# Patient Record
Sex: MALE | NOT HISPANIC OR LATINO | Employment: FULL TIME | URBAN - METROPOLITAN AREA
[De-identification: names, ages, dates, MRNs, and addresses within clinical notes are randomized per-mention and may not be internally consistent; named-entity substitution may affect disease eponyms.]

---

## 2018-10-15 ENCOUNTER — HOSPITAL ENCOUNTER (OUTPATIENT)
Dept: PREADMISSION TESTING | Facility: HOSPITAL | Age: 48
Discharge: HOME | End: 2018-10-15
Attending: ORTHOPAEDIC SURGERY | Admitting: ORTHOPAEDIC SURGERY

## 2018-10-15 LAB
ANION GAP SERPL CALC-SCNC: 15.3 MMOL/L (ref 10–20)
BASOPHILS # BLD AUTO: 0.1 10*3/UL (ref 0–0.2)
BASOPHILS NFR BLD AUTO: 1 % (ref 0–2)
BILIRUB UR QL STRIP: NEGATIVE MG/DL
BUN SERPL-MCNC: 16 MG/DL (ref 8–20)
BUN/CREAT SERPL: 17.8 (ref 6.2–20.3)
CALCIUM SERPL-MCNC: 9.5 MG/DL (ref 8.9–10.3)
CASTS URNS QL MICRO: NORMAL /[LPF]
CHLORIDE SERPL-SCNC: 97 MMOL/L (ref 101–111)
COLOR UR: YELLOW
CONV BACTERIA IN URINE MICRO: NEGATIVE
CONV CLARITY OF URINE: CLEAR
CONV CO2: 29 MMOL/L (ref 22–32)
CONV HYALINE CASTS IN URINE MICRO: 0 /[LPF] (ref 0–5)
CONV PROTEIN IN URINE BY AUTOMATED TEST STRIP: NEGATIVE MG/DL
CONV SMALL ROUND CELLS: NORMAL /[HPF]
CONV UROBILINOGEN IN URINE BY AUTOMATED TEST STRIP: 0.2 MG/DL
CREAT UR-MCNC: 0.9 MG/DL (ref 0.7–1.2)
CULTURE INDICATED?: NORMAL
DIFFERENTIAL METHOD BLD: (no result)
EOSINOPHIL # BLD AUTO: 0.1 10*3/UL (ref 0–0.3)
EOSINOPHIL # BLD AUTO: 1 % (ref 0–3)
ERYTHROCYTE [DISTWIDTH] IN BLOOD BY AUTOMATED COUNT: 13.9 % (ref 11.5–14.5)
GLUCOSE SERPL-MCNC: 109 MG/DL (ref 65–99)
GLUCOSE UR QL: NEGATIVE MG/DL
HCT VFR BLD AUTO: 42.3 % (ref 40–54)
HGB BLD-MCNC: 14.4 G/DL (ref 14–18)
HGB UR QL STRIP: NEGATIVE
KETONES UR QL STRIP: NEGATIVE MG/DL
LEUKOCYTE ESTERASE UR QL STRIP: NEGATIVE
LYMPHOCYTES # BLD AUTO: 2.4 10*3/UL (ref 0.8–4.8)
LYMPHOCYTES NFR BLD AUTO: 21 % (ref 18–42)
MCH RBC QN AUTO: 30.4 PG (ref 26–32)
MCHC RBC AUTO-ENTMCNC: 34.1 G/DL (ref 32–36)
MCV RBC AUTO: 89.1 FL (ref 80–94)
MICRO REPORT STATUS: NORMAL
MONOCYTES # BLD AUTO: 0.7 10*3/UL (ref 0.1–1.3)
MONOCYTES NFR BLD AUTO: 6 % (ref 2–11)
NEUTROPHILS # BLD AUTO: 8.3 10*3/UL (ref 2.3–8.6)
NEUTROPHILS NFR BLD AUTO: 71 % (ref 50–75)
NITRITE UR QL STRIP: NEGATIVE
NRBC BLD AUTO-RTO: 0 /100{WBCS}
NRBC/RBC NFR BLD MANUAL: 0 10*3/UL
PH UR STRIP.AUTO: 7 [PH] (ref 4.5–8)
PLATELET # BLD AUTO: 332 10*3/UL (ref 150–450)
PMV BLD AUTO: 7.6 FL (ref 7.4–10.4)
POTASSIUM SERPL-SCNC: 4.3 MMOL/L (ref 3.6–5.1)
RBC # BLD AUTO: 4.74 10*6/UL (ref 4.6–6)
RBC #/AREA URNS HPF: 0 /[HPF] (ref 0–3)
SODIUM SERPL-SCNC: 137 MMOL/L (ref 136–144)
SP GR UR: 1.01 (ref 1–1.03)
SPERM URNS QL MICRO: NORMAL /[HPF]
SQUAMOUS SPT QL MICRO: 0 /[HPF] (ref 0–5)
UNIDENT CRYS URNS QL MICRO: NORMAL /[HPF]
WBC # BLD AUTO: 11.7 10*3/UL (ref 4.5–11.5)
WBC #/AREA URNS HPF: 0 /[HPF] (ref 0–5)
YEAST SPEC QL WET PREP: NORMAL /[HPF]

## 2018-11-13 ENCOUNTER — TRANSCRIBE ORDERS (OUTPATIENT)
Dept: PHYSICAL THERAPY | Facility: CLINIC | Age: 48
End: 2018-11-13

## 2018-11-13 ENCOUNTER — TREATMENT (OUTPATIENT)
Dept: PHYSICAL THERAPY | Facility: CLINIC | Age: 48
End: 2018-11-13

## 2018-11-13 DIAGNOSIS — M25.512 LEFT SHOULDER PAIN, UNSPECIFIED CHRONICITY: Primary | ICD-10-CM

## 2018-11-13 DIAGNOSIS — M25.512 ACUTE PAIN OF LEFT SHOULDER: Primary | ICD-10-CM

## 2018-11-13 PROCEDURE — 97014 ELECTRIC STIMULATION THERAPY: CPT | Performed by: PHYSICAL THERAPIST

## 2018-11-13 PROCEDURE — 97110 THERAPEUTIC EXERCISES: CPT | Performed by: PHYSICAL THERAPIST

## 2018-11-13 PROCEDURE — 97140 MANUAL THERAPY 1/> REGIONS: CPT | Performed by: PHYSICAL THERAPIST

## 2018-11-13 PROCEDURE — 97161 PT EVAL LOW COMPLEX 20 MIN: CPT | Performed by: PHYSICAL THERAPIST

## 2018-11-13 NOTE — PROGRESS NOTES
Physical Therapy Initial Evaluation and Plan of Care        Subjective Evaluation    History of Present Illness  Date of onset: 2018 (approximately)  Date of surgery: 1st week of october.  Mechanism of injury: Using a will hammer - got stuck in concrete and he pulled hard; linda ~ 6 wks ago for torn RC      Patient Occupation:    Precautions and Work Restrictions: off nowPain  Current pain ratin  At best pain rating: 3  At worst pain ratin  Location: ant left shoulder  Quality: burning, dull ache, throbbing and sharp  Relieving factors: rest, support and ice  Aggravating factors: overhead activity, outstretched reach and movement  Symptom course: better until MD moved his arm.    Hand dominance: right    Diagnostic Tests  MRI studies: abnormal (told significant RCT)    Treatments  Previous treatment: immobilization  Current treatment: medication  Patient Goals  Patient goals for therapy: decreased pain, increased motion, increased strength and return to sport/leisure activities             Objective       Observations     Additional Observation Details  Scars well healed; FHP    Tenderness     Additional Tenderness Details  Mild tenderness around scars and ant GHJ    Cervical/Thoracic Screen   Cervical range of motion within normal limits with the following exceptions: WFL    Active Range of Motion   Left Shoulder   Flexion: 85 degrees   Abduction: 63 degrees   External rotation BTH: Active external rotation behind the head: to top of head.   Internal rotation BTB: sacrum     Right Shoulder   Normal active range of motion    Passive Range of Motion   Left Shoulder   Flexion: 90 degrees   Abduction: 76 (in scap plane) degrees   External rotation 0°: 14 degrees   Internal rotation 0°: 69 (hand at abdomen) degrees     Scapular Mobility   Left Shoulder   Scapular mobility: fair    Strength/Myotome Testing     Additional Strength Details  deferred    Tests     Additional Tests  Details  deffered         Assessment & Plan     Assessment  Impairments: abnormal or restricted ROM, activity intolerance, impaired physical strength and pain with function  Assessment details: 49 yo M presents 6 wks s/p RCR non-dominant shdr with limited and painful ROM, strength and function as expected.  Needs long-term, gradually progressive rehab to restore functional mobility and strength and manage pain  Prognosis: good  Functional Limitations: carrying objects, lifting, pulling, pushing, uncomfortable because of pain, reaching behind back, reaching overhead and unable to perform repetitive tasks  Goals  Plan Goals: STGs (3 weeks)    1. Patient demonstrates compliance with HEP and precautions  2. Patient demonstrates progress with P/AAROM per expectations  3. Patient reports decreased sleep disturbance  4. Patient reports pain with exercises and light ADLs (eg. Grooming) < 5/10    LTGs (3 months)    1. Patient demonstrates independence with HEP and joint protection principles  2. AROM > WFL with min to no pain and strength > 4/5 for improved ADLs  3. Patient presents with min to no signs/sxs of impingement  4. Patient demonstrates tolerance for return to prior level ADLs with min to no restrictions    Plan  Therapy options: will be seen for skilled physical therapy services  Planned modality interventions: cryotherapy and electrical stimulation/Russian stimulation  Planned therapy interventions: manual therapy, therapeutic activities, stretching, strengthening, home exercise program, functional ROM exercises and body mechanics training  Frequency: 2-3 x wk.  Duration in weeks: 16  Treatment plan discussed with: patient        Manual Therapy:    10     mins  50202;  Therapeutic Exercise:    16     mins  86151;       Timed Treatment:   26   mins   Total Treatment:     58   mins    PT SIGNATURE: Laila Sutton PT   DATE TREATMENT INITIATED: 11/13/2018    Initial Certification  Certification Period: 2/11/2019  I  certify that the therapy services are furnished while this patient is under my care.  The services outlined above are required by this patient, and will be reviewed every 90 days.     PHYSICIAN:  Gabby     DATE: 11/13/18    Please sign and return via fax to [phone].. Thank you, [facility].

## 2018-11-14 ENCOUNTER — TREATMENT (OUTPATIENT)
Dept: PHYSICAL THERAPY | Facility: CLINIC | Age: 48
End: 2018-11-14

## 2018-11-14 DIAGNOSIS — M25.512 ACUTE PAIN OF LEFT SHOULDER: Primary | ICD-10-CM

## 2018-11-14 PROCEDURE — 97014 ELECTRIC STIMULATION THERAPY: CPT | Performed by: PHYSICAL THERAPIST

## 2018-11-14 PROCEDURE — 97140 MANUAL THERAPY 1/> REGIONS: CPT | Performed by: PHYSICAL THERAPIST

## 2018-11-14 PROCEDURE — 97110 THERAPEUTIC EXERCISES: CPT | Performed by: PHYSICAL THERAPIST

## 2018-11-14 NOTE — PROGRESS NOTES
Physical Therapy Daily Progress Note      Visit # 2      Subjective   Sore - instinctively reached my arm while in bed and it hurt    Objective   See Exercise, Manual, and Modality Logs for complete treatment.       Assessment/Plan    Continues with mod pain/soreness but able to progress PROM and ex    Continue to progress per protocol as tang             Manual Therapy:    13     mins  21820;  Therapeutic Exercise:    24     mins  90931;         Timed Treatment:   37   mins   Total Treatment:     52   mins    Laial Sutton PT  Physical Therapist

## 2018-11-16 ENCOUNTER — TREATMENT (OUTPATIENT)
Dept: PHYSICAL THERAPY | Facility: CLINIC | Age: 48
End: 2018-11-16

## 2018-11-16 DIAGNOSIS — M25.512 ACUTE PAIN OF LEFT SHOULDER: Primary | ICD-10-CM

## 2018-11-16 PROCEDURE — 97110 THERAPEUTIC EXERCISES: CPT | Performed by: PHYSICAL THERAPIST

## 2018-11-16 PROCEDURE — 97014 ELECTRIC STIMULATION THERAPY: CPT | Performed by: PHYSICAL THERAPIST

## 2018-11-16 PROCEDURE — 97140 MANUAL THERAPY 1/> REGIONS: CPT | Performed by: PHYSICAL THERAPIST

## 2018-11-16 NOTE — PROGRESS NOTES
Physical Therapy Daily Progress Note      Visit # 3      Subjective   Hurting today - rolled over on it during sleep last night.  Hurts mostly here (clavicle/supra-clavicular)    Objective   See Exercise, Manual, and Modality Logs for complete treatment.       Assessment/Plan    Minimal increase in ex today due to being flared-up from sleeping on shoulder.  Elevation PROM progressing nicely.  ER also increasing but still mod limited and painful.    Progress per protocol as tang             Manual Therapy:   14     mins  83148;  Therapeutic Exercise:    24     mins  24283;       Timed Treatment:   38   mins   Total Treatment:     48   mins    Laila Sutton PT  Physical Therapist

## 2018-11-20 ENCOUNTER — TREATMENT (OUTPATIENT)
Dept: PHYSICAL THERAPY | Facility: CLINIC | Age: 48
End: 2018-11-20

## 2018-11-20 DIAGNOSIS — M25.512 ACUTE PAIN OF LEFT SHOULDER: Primary | ICD-10-CM

## 2018-11-20 PROCEDURE — 97140 MANUAL THERAPY 1/> REGIONS: CPT | Performed by: PHYSICAL THERAPIST

## 2018-11-20 PROCEDURE — 97110 THERAPEUTIC EXERCISES: CPT | Performed by: PHYSICAL THERAPIST

## 2018-11-20 NOTE — PROGRESS NOTES
Physical Therapy Daily Progress Note      Visit # 4      Subjective   Sore ever since I slept on it the other day.  Also hurts when I try to raise my arm on its own.    Objective   See Exercise, Manual, and Modality Logs for complete treatment.       Assessment/Plan    P/AAROM improving nicely with manual Rx but still with mod soreness.  Had to caution him not to raise arm actively yet - possible cause of increased soreness.    Progress ex as tang per protocol.             Manual Therapy:    14     mins  59322;  Therapeutic Exercise:    25     mins  61576;       Timed Treatment:   39   mins   Total Treatment:     49   mins    Laila Sutton PT  Physical Therapist

## 2018-11-26 ENCOUNTER — TREATMENT (OUTPATIENT)
Dept: PHYSICAL THERAPY | Facility: CLINIC | Age: 48
End: 2018-11-26

## 2018-11-26 DIAGNOSIS — M25.512 ACUTE PAIN OF LEFT SHOULDER: Primary | ICD-10-CM

## 2018-11-26 PROCEDURE — 97140 MANUAL THERAPY 1/> REGIONS: CPT | Performed by: PHYSICAL THERAPIST

## 2018-11-26 PROCEDURE — 97110 THERAPEUTIC EXERCISES: CPT | Performed by: PHYSICAL THERAPIST

## 2018-11-30 ENCOUNTER — TREATMENT (OUTPATIENT)
Dept: PHYSICAL THERAPY | Facility: CLINIC | Age: 48
End: 2018-11-30

## 2018-11-30 DIAGNOSIS — M25.512 ACUTE PAIN OF LEFT SHOULDER: Primary | ICD-10-CM

## 2018-11-30 PROCEDURE — 97140 MANUAL THERAPY 1/> REGIONS: CPT | Performed by: PHYSICAL THERAPIST

## 2018-11-30 PROCEDURE — 97110 THERAPEUTIC EXERCISES: CPT | Performed by: PHYSICAL THERAPIST

## 2018-11-30 NOTE — PROGRESS NOTES
Physical Therapy Daily Progress Note      Visit # 6      Subjective   Overdid it trying to help father who is injured - shoulder is sore    Objective   See Exercise, Manual, and Modality Logs for complete treatment.       Assessment/Plan    Good tolerance for revision and advancement of exercises despite increased soreness from ADLs    Progress reps as tang and add FF and  ext to hip               Manual Therapy:    12     mins  04213;  Therapeutic Exercise:    29     mins  59270;       Timed Treatment:   41   mins   Total Treatment:     41   mins    Laila Sutton, PT  Physical Therapist

## 2018-12-03 ENCOUNTER — TREATMENT (OUTPATIENT)
Dept: PHYSICAL THERAPY | Facility: CLINIC | Age: 48
End: 2018-12-03

## 2018-12-03 DIAGNOSIS — M25.512 ACUTE PAIN OF LEFT SHOULDER: Primary | ICD-10-CM

## 2018-12-03 PROCEDURE — 97110 THERAPEUTIC EXERCISES: CPT | Performed by: PHYSICAL THERAPIST

## 2018-12-03 PROCEDURE — 97140 MANUAL THERAPY 1/> REGIONS: CPT | Performed by: PHYSICAL THERAPIST

## 2018-12-03 NOTE — PROGRESS NOTES
Physical Therapy Daily Progress Note      Visit # 7      Subjective   Still sore but moving along    Objective   See Exercise, Manual, and Modality Logs for complete treatment.       Assessment/Plan    P/AAROM improving nicely and tolerating advancing ex.  Still with mod c/o soreness and scapular tightness      Re-assess           Manual Therapy:    12     mins  92147;  Therapeutic Exercise:    32     mins  67531;       Timed Treatment:   44   mins   Total Treatment:     44   mins    Laila Sutton PT  Physical Therapist

## 2018-12-05 ENCOUNTER — TREATMENT (OUTPATIENT)
Dept: PHYSICAL THERAPY | Facility: CLINIC | Age: 48
End: 2018-12-05

## 2018-12-05 DIAGNOSIS — M25.512 ACUTE PAIN OF LEFT SHOULDER: Primary | ICD-10-CM

## 2018-12-05 PROCEDURE — 97110 THERAPEUTIC EXERCISES: CPT | Performed by: PHYSICAL THERAPIST

## 2018-12-05 PROCEDURE — 97140 MANUAL THERAPY 1/> REGIONS: CPT | Performed by: PHYSICAL THERAPIST

## 2018-12-05 NOTE — PROGRESS NOTES
Physical Therapy  Progress Note          12/5/2018  Luciano Pierre MD    Re: Wilner Greer  ________________________________________________________________    Mr. Wilner Greer, has attended 8/10 PT sessions.  Treatment has consisted of: manual, there-ex, HEP, modalities and pt education.     S: Mr. Wilner Greer states: shoulder popping at times - feels a jammed finger.  Pain is still moderate and constant        Subjective     Objective       Observations     Additional Observation Details  Scars well healed; FHP    Tenderness     Additional Tenderness Details  Mild tenderness around scars and ant GHJ    Cervical/Thoracic Screen   Cervical range of motion within normal limits with the following exceptions: WFL    Active Range of Motion   Left Shoulder   Flexion: 130 degrees with pain  Abduction: 121 degrees with pain  External rotation BTH: C3 with pain  Internal rotation BTB: T12 with pain    Right Shoulder   Normal active range of motion    Passive Range of Motion   Left Shoulder   Flexion: 161 degrees   Abduction: 162 (in scap plane) degrees   External rotation 45°: 60 degrees   Internal rotation 45°: 86 degrees     Scapular Mobility   Left Shoulder   Scapular mobility: fair    Strength/Myotome Testing     Left Shoulder     Planes of Motion   Flexion: 4   Abduction: 4   External rotation at 0°: 4+   Internal rotation at 0°: 5     Right Shoulder   Normal muscle strength    Tests     Left Shoulder   Positive empty can and Hawkin's.      See Exercise, Manual, and Modality Logs for complete treatment.       Assessment/Plan    Good improvement in A/PROM and strength but still with some impingement and instability and moderate pain.    To MD             Manual Therapy:    14     mins  20583;  Therapeutic Exercise:    32     mins  20029;       Timed Treatment:   46   mins   Total Treatment:     46   mins    Laila Sutton PT  Physical Therapist

## 2018-12-10 ENCOUNTER — TREATMENT (OUTPATIENT)
Dept: PHYSICAL THERAPY | Facility: CLINIC | Age: 48
End: 2018-12-10

## 2018-12-10 DIAGNOSIS — M25.512 ACUTE PAIN OF LEFT SHOULDER: Primary | ICD-10-CM

## 2018-12-10 PROCEDURE — 97140 MANUAL THERAPY 1/> REGIONS: CPT | Performed by: PHYSICAL THERAPIST

## 2018-12-10 PROCEDURE — 97110 THERAPEUTIC EXERCISES: CPT | Performed by: PHYSICAL THERAPIST

## 2018-12-10 NOTE — PROGRESS NOTES
Physical Therapy Daily Progress Note    Visit # : 9  Wilner Greer reports: my shoulder is doing well; still hard to raise it over my head.  My MD is pleased with my progress    Subjective     Objective   See Exercise, Manual, and Modality Logs for complete treatment.       Assessment/Plan  Pt exhibits good jt mobility with reports of end range soreness with manual interventions.    Progress strengthening /stabilization /functional activity  Progress ball slides to wall         Manual Therapy:    12     mins  67396;  Therapeutic Exercise:    30     mins  11903;     Neuromuscular Delonte:    -    mins  28176;    Therapeutic Activity:     -     mins  18805;     Gait Training:      -     mins  94739;     Ultrasound:     -     mins  98710;    Electrical Stimulation:    -     mins  25815 ( );  Iontophoresis                 -     mins 68968      Timed Treatment:   42   mins   Total Treatment:     42   mins        Rianna Lackey PT  Physical Therapist  [place] License # [id number]

## 2018-12-14 ENCOUNTER — TREATMENT (OUTPATIENT)
Dept: PHYSICAL THERAPY | Facility: CLINIC | Age: 48
End: 2018-12-14

## 2018-12-14 DIAGNOSIS — M25.512 ACUTE PAIN OF LEFT SHOULDER: Primary | ICD-10-CM

## 2018-12-14 PROCEDURE — 97140 MANUAL THERAPY 1/> REGIONS: CPT | Performed by: PHYSICAL THERAPIST

## 2018-12-14 PROCEDURE — 97110 THERAPEUTIC EXERCISES: CPT | Performed by: PHYSICAL THERAPIST

## 2018-12-14 NOTE — PROGRESS NOTES
Physical Therapy Daily Progress Note      Visit # 10      Subjective   Doing OK - more mobility but still pain and weakness    Objective   See Exercise, Manual, and Modality Logs for complete treatment.       Assessment/Plan    ROM improving but still with intermittent impingement, pain and weakness.  Able to progress strengthening ex but fatigued    Progress reps on strengthening as tang             Manual Therapy:    14     mins  68766;  Therapeutic Exercise:    38     mins  34673;       Timed Treatment:   52   mins   Total Treatment:     52   mins    Laila Sutton PT  Physical Therapist

## 2018-12-17 ENCOUNTER — TREATMENT (OUTPATIENT)
Dept: PHYSICAL THERAPY | Facility: CLINIC | Age: 48
End: 2018-12-17

## 2018-12-17 DIAGNOSIS — M25.512 ACUTE PAIN OF LEFT SHOULDER: Primary | ICD-10-CM

## 2018-12-17 PROCEDURE — 97140 MANUAL THERAPY 1/> REGIONS: CPT | Performed by: PHYSICAL THERAPIST

## 2018-12-17 PROCEDURE — 97110 THERAPEUTIC EXERCISES: CPT | Performed by: PHYSICAL THERAPIST

## 2018-12-17 NOTE — PROGRESS NOTES
Physical Therapy Daily Progress Note      Visit # 11      Subjective   Pretty good actually - a little stiff from being active over the weekend with grandkids    Objective   See Exercise, Manual, and Modality Logs for complete treatment.       Assessment/Plan    ROM progressing to near full PROM but still with EOR pain and tightness but less impingement.  Gradually improving strength.    Progress strengthening reps as tang             Manual Therapy:    12     mins  16565;  Therapeutic Exercise:    38     mins  24731;       Timed Treatment:   50   mins   Total Treatment:     50   mins    Laila Sutton PT  Physical Therapist

## 2018-12-19 ENCOUNTER — TREATMENT (OUTPATIENT)
Dept: PHYSICAL THERAPY | Facility: CLINIC | Age: 48
End: 2018-12-19

## 2018-12-19 DIAGNOSIS — M25.512 ACUTE PAIN OF LEFT SHOULDER: Primary | ICD-10-CM

## 2018-12-19 PROCEDURE — 97140 MANUAL THERAPY 1/> REGIONS: CPT | Performed by: PHYSICAL THERAPIST

## 2018-12-19 PROCEDURE — 97110 THERAPEUTIC EXERCISES: CPT | Performed by: PHYSICAL THERAPIST

## 2018-12-19 NOTE — PROGRESS NOTES
Physical Therapy Daily Progress Note      Visit # 12      Subjective   Pretty sore the past couple days    Objective   See Exercise, Manual, and Modality Logs for complete treatment.       Assessment/Plan    Painful arc with elevation around .  Increased soreness from some of the band ex most likely - improved tolerance for changes to ex.     Continue per POC as tang; try table towel or ball scaption vs cane             Manual Therapy:    14     mins  95676;  Therapeutic Exercise:    39     mins  73067;       Timed Treatment:   53   mins   Total Treatment:     53   mins    Laila Sutton PT  Physical Therapist

## 2018-12-24 ENCOUNTER — TREATMENT (OUTPATIENT)
Dept: PHYSICAL THERAPY | Facility: CLINIC | Age: 48
End: 2018-12-24

## 2018-12-24 DIAGNOSIS — M25.512 ACUTE PAIN OF LEFT SHOULDER: Primary | ICD-10-CM

## 2018-12-24 PROCEDURE — 97110 THERAPEUTIC EXERCISES: CPT | Performed by: PHYSICAL THERAPIST

## 2018-12-24 PROCEDURE — 97140 MANUAL THERAPY 1/> REGIONS: CPT | Performed by: PHYSICAL THERAPIST

## 2018-12-24 NOTE — PROGRESS NOTES
Physical Therapy Daily Progress Note      Visit # 13      Subjective   Feeling bad in general but shoulder just sore    Objective       Passive Range of Motion   Left Shoulder   Flexion: 165 degrees   Abduction: 163 degrees   External rotation 90°: 76 degrees      See Exercise, Manual, and Modality Logs for complete treatment.       Assessment/Plan    Soreness persists but ROM gradually improving    Continue to progress ex as tang - try wall slides             Manual Therapy:    13     mins  50029;  Therapeutic Exercise:    39     mins  96737;         Timed Treatment:   52   mins   Total Treatment:     52   mins    Laila Sutton, PT  Physical Therapist

## 2018-12-26 ENCOUNTER — TREATMENT (OUTPATIENT)
Dept: PHYSICAL THERAPY | Facility: CLINIC | Age: 48
End: 2018-12-26

## 2018-12-26 DIAGNOSIS — M25.512 ACUTE PAIN OF LEFT SHOULDER: Primary | ICD-10-CM

## 2018-12-26 PROCEDURE — 97140 MANUAL THERAPY 1/> REGIONS: CPT | Performed by: PHYSICAL THERAPIST

## 2018-12-26 PROCEDURE — 97110 THERAPEUTIC EXERCISES: CPT | Performed by: PHYSICAL THERAPIST

## 2018-12-26 NOTE — PROGRESS NOTES
Physical Therapy Daily Progress Note      Visit # 14      Subjective   Not too bad - pain about 4/10    Objective   See Exercise, Manual, and Modality Logs for complete treatment.       Assessment/Plan    Slow improvement in mobility and strength.  Pain decreasing very gradually, limiting progression of ex.    Try prone T             Manual Therapy:    12     mins  89401;  Therapeutic Exercise:    40     mins  43553;       Timed Treatment:   52   mins   Total Treatment:     52  mins    Laila Sutton PT  Physical Therapist

## 2018-12-31 ENCOUNTER — TREATMENT (OUTPATIENT)
Dept: PHYSICAL THERAPY | Facility: CLINIC | Age: 48
End: 2018-12-31

## 2018-12-31 DIAGNOSIS — M25.512 ACUTE PAIN OF LEFT SHOULDER: Primary | ICD-10-CM

## 2018-12-31 PROCEDURE — 97110 THERAPEUTIC EXERCISES: CPT | Performed by: PHYSICAL THERAPIST

## 2018-12-31 PROCEDURE — 97140 MANUAL THERAPY 1/> REGIONS: CPT | Performed by: PHYSICAL THERAPIST

## 2018-12-31 NOTE — PROGRESS NOTES
Physical Therapy Daily Progress Note      Visit # 15      Subjective   Still sore and sharp pain certain ways I move it (abduction)    Objective   See Exercise, Manual, and Modality Logs for complete treatment.       Assessment/Plan    Continues pain limiting progression but overall improving very gradually with ROM and use of LUE.    reassess             Manual Therapy:    13     mins  26863;  Therapeutic Exercise:    39     mins  31108;       Timed Treatment:   52   mins   Total Treatment:     52   mins    Laila Sutton PT  Physical Therapist

## 2019-01-02 ENCOUNTER — TREATMENT (OUTPATIENT)
Dept: PHYSICAL THERAPY | Facility: CLINIC | Age: 49
End: 2019-01-02

## 2019-01-02 DIAGNOSIS — M25.512 ACUTE PAIN OF LEFT SHOULDER: Primary | ICD-10-CM

## 2019-01-02 PROCEDURE — 97110 THERAPEUTIC EXERCISES: CPT | Performed by: PHYSICAL THERAPIST

## 2019-01-02 PROCEDURE — 97140 MANUAL THERAPY 1/> REGIONS: CPT | Performed by: PHYSICAL THERAPIST

## 2019-01-02 NOTE — PROGRESS NOTES
Physical Therapy Daily Progress Note    To:  Luciano Pierre MD    Visit # 16  Treatment has consisted of progressive manual, there-ex and HEP as tolerated    Subjective   Still about the same soreness    Objective       Observations     Additional Observation Details  Scars well healed; FHP    Tenderness     Additional Tenderness Details  Mild tenderness around ant GHJ    Cervical/Thoracic Screen   Cervical range of motion within normal limits with the following exceptions: WFL    Active Range of Motion   Left Shoulder   Flexion: 137 degrees   Abduction: 125 degrees with pain  External rotation BTH: C7 with pain  Internal rotation BTB: T10 with pain    Right Shoulder   Normal active range of motion    Passive Range of Motion   Left Shoulder   Flexion: 166 degrees   Abduction: 164 (in scap plane) degrees   External rotation 90°: 76 degrees   Internal rotation 90°: 79 degrees     Scapular Mobility   Left Shoulder   Scapular mobility: fair    Strength/Myotome Testing     Left Shoulder     Planes of Motion   Flexion: 4+   Abduction: 4+   External rotation at 0°: 4+   Internal rotation at 0°: 5     Right Shoulder   Normal muscle strength    Tests     Left Shoulder   Positive empty can, full can and Hawkin's.      See Exercise, Manual, and Modality Logs for complete treatment.       Assessment/Plan    ROM is very gradually improving but pain is limiting overall progress.  ROM improves with manual but then tightens back up soon after with mm and capsular tension.  Needs continued PT focusing on improving mobility and decreasing pain and impingement.    To MD             Manual Therapy:    16     mins  02373;  Therapeutic Exercise:    39     mins  52038;       Timed Treatment:   55   mins   Total Treatment:     55   mins    Laila Sutton PT  Physical Therapist

## 2019-01-09 ENCOUNTER — TREATMENT (OUTPATIENT)
Dept: PHYSICAL THERAPY | Facility: CLINIC | Age: 49
End: 2019-01-09

## 2019-01-09 DIAGNOSIS — M25.512 ACUTE PAIN OF LEFT SHOULDER: Primary | ICD-10-CM

## 2019-01-09 PROCEDURE — 97110 THERAPEUTIC EXERCISES: CPT | Performed by: PHYSICAL THERAPIST

## 2019-01-09 PROCEDURE — 97140 MANUAL THERAPY 1/> REGIONS: CPT | Performed by: PHYSICAL THERAPIST

## 2019-01-09 NOTE — PROGRESS NOTES
Physical Therapy Daily Progress Note      Visit # 17      Subjective   MD said I am doing better than expected; pain is typically about 4/10 - not taking anymeds    Objective   See Exercise, Manual, and Modality Logs for complete treatment.       Assessment/Plan    Continues with gradual improvement and ahead of schedule per MD expectations.  Still with scap-thor tightness limiting AROM    Add thoracic rotation and ex over 1/2 roll             Manual Therapy:    14     mins  14670;  Therapeutic Exercise:    40     mins  94459;       Timed Treatment:   54   mins   Total Treatment:     54   mins    Laila Sutton PT  Physical Therapist

## 2019-01-11 ENCOUNTER — TREATMENT (OUTPATIENT)
Dept: PHYSICAL THERAPY | Facility: CLINIC | Age: 49
End: 2019-01-11

## 2019-01-11 DIAGNOSIS — M25.512 ACUTE PAIN OF LEFT SHOULDER: Primary | ICD-10-CM

## 2019-01-11 PROCEDURE — 97140 MANUAL THERAPY 1/> REGIONS: CPT | Performed by: PHYSICAL THERAPIST

## 2019-01-11 PROCEDURE — 97110 THERAPEUTIC EXERCISES: CPT | Performed by: PHYSICAL THERAPIST

## 2019-01-11 NOTE — PROGRESS NOTES
Physical Therapy Daily Progress Note      Visit # 18      Subjective   Feeling better overall but sore with the weather change    Objective   See Exercise, Manual, and Modality Logs for complete treatment.       Assessment/Plan    ROM improving with dec c/o pain but with some popping with IR - improved after adjusted range.    Progress strengthening gradually as tang             Manual Therapy:    15     mins  48240;  Therapeutic Exercise:    41     mins  58928;         Timed Treatment:   56   mins   Total Treatment:     56   mins    Laila Sutton PT  Physical Therapist

## 2019-01-15 ENCOUNTER — TREATMENT (OUTPATIENT)
Dept: PHYSICAL THERAPY | Facility: CLINIC | Age: 49
End: 2019-01-15

## 2019-01-15 DIAGNOSIS — M25.512 ACUTE PAIN OF LEFT SHOULDER: Primary | ICD-10-CM

## 2019-01-15 PROCEDURE — 97110 THERAPEUTIC EXERCISES: CPT | Performed by: PHYSICAL THERAPIST

## 2019-01-15 PROCEDURE — 97140 MANUAL THERAPY 1/> REGIONS: CPT | Performed by: PHYSICAL THERAPIST

## 2019-01-15 NOTE — PROGRESS NOTES
Physical Therapy Daily Progress Note      Visit # 19      Subjective   Nothing new - still sore but doing OK    Objective   See Exercise, Manual, and Modality Logs for complete treatment.       Assessment/Plan    Continues with some inhibition with scapular exercises and persistent mild soreness    Progress scapular strengthening as tang             Manual Therapy:    14     mins  05622;  Therapeutic Exercise:    38    mins  78223;       Timed Treatment:   52   mins   Total Treatment:     52   mins    Laila Sutton PT  Physical Therapist

## 2019-01-18 ENCOUNTER — TREATMENT (OUTPATIENT)
Dept: PHYSICAL THERAPY | Facility: CLINIC | Age: 49
End: 2019-01-18

## 2019-01-18 DIAGNOSIS — M25.512 ACUTE PAIN OF LEFT SHOULDER: Primary | ICD-10-CM

## 2019-01-18 PROCEDURE — 97140 MANUAL THERAPY 1/> REGIONS: CPT | Performed by: PHYSICAL THERAPIST

## 2019-01-18 PROCEDURE — 97110 THERAPEUTIC EXERCISES: CPT | Performed by: PHYSICAL THERAPIST

## 2019-01-18 NOTE — PROGRESS NOTES
Physical Therapy Daily Progress Note      Visit # 20      Subjective   Getting better - slowly but surely    Objective   See Exercise, Manual, and Modality Logs for complete treatment.       Assessment/Plan      Continues with gradual progress with near full PROM and tolerating mild increases in exercises with dec pain    Progress strengthening as tang           Manual Therapy:    12     mins  57217;  Therapeutic Exercise:    34     mins  44376;         Timed Treatment:   46   mins   Total Treatment:     46   mins    Laila Sutton PT  Physical Therapist

## 2019-01-23 ENCOUNTER — TREATMENT (OUTPATIENT)
Dept: PHYSICAL THERAPY | Facility: CLINIC | Age: 49
End: 2019-01-23

## 2019-01-23 DIAGNOSIS — M25.512 ACUTE PAIN OF LEFT SHOULDER: Primary | ICD-10-CM

## 2019-01-23 PROCEDURE — 97140 MANUAL THERAPY 1/> REGIONS: CPT | Performed by: PHYSICAL THERAPIST

## 2019-01-23 PROCEDURE — 97110 THERAPEUTIC EXERCISES: CPT | Performed by: PHYSICAL THERAPIST

## 2019-01-23 NOTE — PROGRESS NOTES
Physical Therapy Daily Progress Note      Visit # 21      Subjective   Slipped on ice and fell onto Left shoulder on Sunday.  Couldn't move it much that day but has been getting day    Objective       Tests     Additional Tests Details  Good control with drop arm but with pain     See Exercise, Manual, and Modality Logs for complete treatment.       Assessment/Plan    Flared-up from fall with increased pain and sl limited PROM but no obvious compromise to repair    Continue to monitor for possible compromise and progress accordingly             Manual Therapy:    12     mins  68047;  Therapeutic Exercise:    30     mins  77444;         Timed Treatment:   42   mins   Total Treatment:     42   mins    Laila Sutton, PT  Physical Therapist

## 2019-01-25 ENCOUNTER — TREATMENT (OUTPATIENT)
Dept: PHYSICAL THERAPY | Facility: CLINIC | Age: 49
End: 2019-01-25

## 2019-01-25 DIAGNOSIS — M25.512 ACUTE PAIN OF LEFT SHOULDER: Primary | ICD-10-CM

## 2019-01-25 PROCEDURE — 97140 MANUAL THERAPY 1/> REGIONS: CPT | Performed by: PHYSICAL THERAPIST

## 2019-01-25 PROCEDURE — 97110 THERAPEUTIC EXERCISES: CPT | Performed by: PHYSICAL THERAPIST

## 2019-01-25 NOTE — PROGRESS NOTES
Physical Therapy Daily Progress Note      Visit # 22      Subjective   It's coming back around but still have trouble with this movement (abduction)    Objective   See Exercise, Manual, and Modality Logs for complete treatment.       Assessment/Plan    Flare-up from fall improving.  Still with increased pain vs before the fall but much less than last visit.  PROM nearly returned to pre-fall motion and able to increase ex.    If continues to improve try return to prior level ex             Manual Therapy:    13     mins  86752;  Therapeutic Exercise:    33     mins  15846;         Timed Treatment:   46   mins   Total Treatment:     46   mins    Laila Sutton, ROSETTA  Physical Therapist

## 2019-01-29 ENCOUNTER — TREATMENT (OUTPATIENT)
Dept: PHYSICAL THERAPY | Facility: CLINIC | Age: 49
End: 2019-01-29

## 2019-01-29 DIAGNOSIS — M25.512 ACUTE PAIN OF LEFT SHOULDER: Primary | ICD-10-CM

## 2019-01-29 PROCEDURE — 97140 MANUAL THERAPY 1/> REGIONS: CPT | Performed by: PHYSICAL THERAPIST

## 2019-01-29 PROCEDURE — 97110 THERAPEUTIC EXERCISES: CPT | Performed by: PHYSICAL THERAPIST

## 2019-01-29 NOTE — PROGRESS NOTES
Physical Therapy Daily Progress Note      Visit # 23      Subjective   Still sore but much better    Objective   See Exercise, Manual, and Modality Logs for complete treatment.       Assessment/Plan    Flare-up from fall subsiding.  Near full PROM except IR.  Ex limited today due to patient time constraints    Re-assess         Manual Therapy:    13     mins  17055;  Therapeutic Exercise:    28     mins  26642;       Timed Treatment:   41   mins   Total Treatment:     41   mins    Laila Sutton PT  Physical Therapist

## 2019-01-30 ENCOUNTER — TREATMENT (OUTPATIENT)
Dept: PHYSICAL THERAPY | Facility: CLINIC | Age: 49
End: 2019-01-30

## 2019-01-30 DIAGNOSIS — M25.512 ACUTE PAIN OF LEFT SHOULDER: Primary | ICD-10-CM

## 2019-01-30 PROCEDURE — 97140 MANUAL THERAPY 1/> REGIONS: CPT | Performed by: PHYSICAL THERAPIST

## 2019-01-30 PROCEDURE — 97110 THERAPEUTIC EXERCISES: CPT | Performed by: PHYSICAL THERAPIST

## 2019-01-30 NOTE — PROGRESS NOTES
Physical Therapy  Progress Note          1/30/2019  Luciano Pierre MD    Re: Wilner Greer  ________________________________________________________________    Mr. Wilner Greer, has attended 24 PT sessions.  Treatment has consisted of: manual, NMR, progressive there-ex, HEP, pt education     S: Mr. Wilner Greer states: Feeling pretty good now but popping more now.    NOTE: reported slipping on ice and falling onto Left shoulder on 1/20/19. Did not have arm outstretched.  Had significant pain initially but has subsided since.      Subjective     Objective       Observations     Additional Observation Details  Scars well healed; FHP    Tenderness     Left Shoulder   Tenderness in the AC joint and subacromial bursa.     Cervical/Thoracic Screen   Cervical range of motion within normal limits with the following exceptions: WFL    Active Range of Motion   Left Shoulder   Flexion: 137 degrees with pain  Abduction: 136 (scap plane) degrees   External rotation BTH: T1   Internal rotation BTB: L2 with pain    Right Shoulder   Normal active range of motion    Passive Range of Motion   Left Shoulder   Flexion: 170 degrees   Abduction: 167 (in scap plane) degrees   External rotation 90°: 80 degrees   Internal rotation 90°: 80 degrees     Scapular Mobility   Left Shoulder   Scapular mobility: fair    Strength/Myotome Testing     Left Shoulder     Planes of Motion   Flexion: 4+   Abduction: 4+   External rotation at 0°: 4+   Left shoulder internal rotation strength at 0 degrees: 5-/5.     Right Shoulder   Normal muscle strength    Tests     Left Shoulder   Positive empty can, full can, Hawkin's and Speed's.     Additional Tests Details  Sl + sulcus L vs R     See Exercise, Manual, and Modality Logs for complete treatment.       Assessment/Plan    Fared-up last week from a fall onto the left shoulder and had to decrease exercises/activities.  Has since improved but there is minimal change now since last update whereas he  was showing more improvement before the fall.  Some increase in pain and impingement with mild instability but no obvious compromise to repair.    To MD             Manual Therapy:    14     mins  65945;  Therapeutic Exercise:    42     mins  61954;       Timed Treatment:   56   mins   Total Treatment:     56   mins    Laila Sutton PT  Physical Therapist

## 2019-02-01 ENCOUNTER — TREATMENT (OUTPATIENT)
Dept: PHYSICAL THERAPY | Facility: CLINIC | Age: 49
End: 2019-02-01

## 2019-02-01 DIAGNOSIS — M25.512 ACUTE PAIN OF LEFT SHOULDER: Primary | ICD-10-CM

## 2019-02-01 PROCEDURE — 97112 NEUROMUSCULAR REEDUCATION: CPT | Performed by: PHYSICAL THERAPIST

## 2019-02-01 PROCEDURE — 97110 THERAPEUTIC EXERCISES: CPT | Performed by: PHYSICAL THERAPIST

## 2019-02-01 NOTE — PROGRESS NOTES
Physical Therapy Daily Progress Note      Visit # 25      Subjective   MD doesn't think I did any damage - said to continue PT and on 20 lb restriction.  Says I'm doing well considering how much damage I had.    Objective   See Exercise, Manual, and Modality Logs for complete treatment.       Assessment/Plan    Continue with gradual improvement as flare-up resolves.  Tolerated light progression of several ex    Continue to progress strengthening as tang             Manual Therapy:    2     mins  73091;  Therapeutic Exercise:    39     mins  56098;     Neuromuscular Delonte:    9    mins  14005;      Timed Treatment:   50   mins   Total Treatment:     50   mins    Laila Sutton, ROSETTA  Physical Therapist

## 2019-02-05 ENCOUNTER — TREATMENT (OUTPATIENT)
Dept: PHYSICAL THERAPY | Facility: CLINIC | Age: 49
End: 2019-02-05

## 2019-02-05 DIAGNOSIS — M25.512 ACUTE PAIN OF LEFT SHOULDER: Primary | ICD-10-CM

## 2019-02-05 PROCEDURE — 97110 THERAPEUTIC EXERCISES: CPT | Performed by: PHYSICAL THERAPIST

## 2019-02-05 PROCEDURE — 97112 NEUROMUSCULAR REEDUCATION: CPT | Performed by: PHYSICAL THERAPIST

## 2019-02-05 NOTE — PROGRESS NOTES
Physical Therapy Daily Progress Note      Visit # 26      Subjective   Soreness from the fall is subsiding - about back to where I was before that    Objective   See Exercise, Manual, and Modality Logs for complete treatment.       Assessment/Plan    Improving scap mobility and strength noted with manual/NMR.  Flared-up primarily resolved.    Gradually increase resistance and/or reps; try band wall walks             Manual Therapy:    3     mins  04902;  Therapeutic Exercise:    38     mins  30443;     Neuromuscular Delonte:    8    mins  62936;      Timed Treatment:   49   mins   Total Treatment:     49   mins    Laila Sutotn, PT  Physical Therapist

## 2019-02-08 ENCOUNTER — TREATMENT (OUTPATIENT)
Dept: PHYSICAL THERAPY | Facility: CLINIC | Age: 49
End: 2019-02-08

## 2019-02-08 DIAGNOSIS — M25.512 ACUTE PAIN OF LEFT SHOULDER: Primary | ICD-10-CM

## 2019-02-08 PROCEDURE — 97110 THERAPEUTIC EXERCISES: CPT | Performed by: PHYSICAL THERAPIST

## 2019-02-08 PROCEDURE — 97112 NEUROMUSCULAR REEDUCATION: CPT | Performed by: PHYSICAL THERAPIST

## 2019-02-08 NOTE — PROGRESS NOTES
Physical Therapy Daily Progress Note      Visit # 27      Subjective   sore and tight from sleeping on it wrong.  Missed the other day because I have no energy - they doubled my BP meds.    Objective   See Exercise, Manual, and Modality Logs for complete treatment.       Assessment/Plan    Good PROM without pain except mild tightness with IR, post capsule.  Tolerated light progression of ex despite soreness    Progress gradually as tang             Manual Therapy:    1     mins  30681;  Therapeutic Exercise:    41     mins  37730;     Neuromuscular Delonte:    8    mins  90971;      Timed Treatment:   50   mins   Total Treatment:     50   mins    Laila Sutton, PT  Physical Therapist

## 2019-02-12 ENCOUNTER — TREATMENT (OUTPATIENT)
Dept: PHYSICAL THERAPY | Facility: CLINIC | Age: 49
End: 2019-02-12

## 2019-02-12 DIAGNOSIS — M25.512 ACUTE PAIN OF LEFT SHOULDER: Primary | ICD-10-CM

## 2019-02-12 PROCEDURE — 97110 THERAPEUTIC EXERCISES: CPT | Performed by: PHYSICAL THERAPIST

## 2019-02-12 PROCEDURE — 97112 NEUROMUSCULAR REEDUCATION: CPT | Performed by: PHYSICAL THERAPIST

## 2019-02-12 NOTE — PROGRESS NOTES
Physical Therapy Daily Progress Note      Visit # 28      Subjective   Sore but about the same as usual.  The rain bothers me.    Objective   See Exercise, Manual, and Modality Logs for complete treatment.       Assessment/Plan    Able to increase ex lightly despite report of soreness.  Improved scapular mobility and strength noted with manual/PNF.    Progress ex as tang, reps and/or resistance             Manual Therapy:    2     mins  54680;  Therapeutic Exercise:    42     mins  74164;     Neuromuscular Delonte:    8    mins  13042;      Timed Treatment:   52   mins   Total Treatment:     52   mins    Laila Sutton PT  Physical Therapist

## 2019-02-13 ENCOUNTER — TREATMENT (OUTPATIENT)
Dept: PHYSICAL THERAPY | Facility: CLINIC | Age: 49
End: 2019-02-13

## 2019-02-13 DIAGNOSIS — M25.512 ACUTE PAIN OF LEFT SHOULDER: Primary | ICD-10-CM

## 2019-02-13 PROCEDURE — 97110 THERAPEUTIC EXERCISES: CPT | Performed by: PHYSICAL THERAPIST

## 2019-02-13 PROCEDURE — 97112 NEUROMUSCULAR REEDUCATION: CPT | Performed by: PHYSICAL THERAPIST

## 2019-02-13 NOTE — PROGRESS NOTES
Physical Therapy Daily Progress Note      Visit # 29      Subjective   Feeling good today    Objective   See Exercise, Manual, and Modality Logs for complete treatment.       Assessment/Plan    Improving scapular mobility and strength and gradually decreasing pain.  Still pain with pure abduction at 90 deg      Progress strengthening as tang           Manual Therapy:    2     mins  40081;  Therapeutic Exercise:    40     mins  16202;     Neuromuscular Delonte:    8    mins  47320;      Timed Treatment:   50   mins   Total Treatment:     50   mins    Laila Sutton PT  Physical Therapist

## 2019-02-15 ENCOUNTER — TREATMENT (OUTPATIENT)
Dept: PHYSICAL THERAPY | Facility: CLINIC | Age: 49
End: 2019-02-15

## 2019-02-15 DIAGNOSIS — M25.512 ACUTE PAIN OF LEFT SHOULDER: Primary | ICD-10-CM

## 2019-02-15 PROCEDURE — 97112 NEUROMUSCULAR REEDUCATION: CPT | Performed by: PHYSICAL THERAPIST

## 2019-02-15 PROCEDURE — 97110 THERAPEUTIC EXERCISES: CPT | Performed by: PHYSICAL THERAPIST

## 2019-02-15 NOTE — PROGRESS NOTES
Physical Therapy Daily Progress Note      Visit # 30      Subjective   Sore but a different soreness.  Now that I'm  moving better my muscles feel like they are working more.  Like a workout soreness.    Objective   See Exercise, Manual, and Modality Logs for complete treatment.       Assessment/Plan    Scapular and shoulder mobility continue to improve with decreasing mm inhibiition.    Progress scap and RC stregnthening as tang             Manual Therapy:    0     mins  23099;  Therapeutic Exercise:    40     mins  03094;     Neuromuscular Delonte:    9    mins  20151;      Timed Treatment:   49   mins   Total Treatment:     49   mins    Laila Sutton PT  Physical Therapist

## 2019-02-25 ENCOUNTER — TREATMENT (OUTPATIENT)
Dept: PHYSICAL THERAPY | Facility: CLINIC | Age: 49
End: 2019-02-25

## 2019-02-25 DIAGNOSIS — M25.512 ACUTE PAIN OF LEFT SHOULDER: Primary | ICD-10-CM

## 2019-02-25 PROCEDURE — 97110 THERAPEUTIC EXERCISES: CPT | Performed by: PHYSICAL THERAPIST

## 2019-02-25 PROCEDURE — 97112 NEUROMUSCULAR REEDUCATION: CPT | Performed by: PHYSICAL THERAPIST

## 2019-02-25 NOTE — PROGRESS NOTES
Physical Therapy Daily Progress Note      Visit # 31      Subjective   It's not been bad but I have been having N/T in the pinky finger.  Missed PT last week due to death in the family.      Objective   See Exercise, Manual, and Modality Logs for complete treatment.   + Tinel cubital tunnel    Assessment/Plan    Return of ulnar sxs - possibly from mm tightness. Missed PT last week due to death in family.    Continue to progress gradually as tang             Manual Therapy:    1     mins  22439;  Therapeutic Exercise:    40     mins  82109;     Neuromuscular Delonte:    9    mins  34064;        Timed Treatment:   50   mins   Total Treatment:     50   mins    Laila Sutton, ROSETTA  Physical Therapist

## 2019-03-06 ENCOUNTER — TREATMENT (OUTPATIENT)
Dept: PHYSICAL THERAPY | Facility: CLINIC | Age: 49
End: 2019-03-06

## 2019-03-06 DIAGNOSIS — M25.512 ACUTE PAIN OF LEFT SHOULDER: Primary | ICD-10-CM

## 2019-03-06 PROCEDURE — 97112 NEUROMUSCULAR REEDUCATION: CPT | Performed by: PHYSICAL THERAPIST

## 2019-03-06 PROCEDURE — 97110 THERAPEUTIC EXERCISES: CPT | Performed by: PHYSICAL THERAPIST

## 2019-03-06 NOTE — PROGRESS NOTES
Physical Therapy  Progress Note          3/6/2019  Luciano Pierre MD    Re: Wilner Greer  ________________________________________________________________    Mr. Wilner Greer, has attended 32 PT sessions.  Treatment has consisted of: manual, NMR, progressive There-ex, HEP     S: Mr. Wilner Greer states: shoulder not bad except still issues with movement this way (pure abduction) and have been having N/T last 2 fingers (says he just woke up with it one morning).      Subjective     Objective       Observations     Additional Observation Details  Scars well healed; FHP    Tenderness     Left Shoulder   Tenderness in the AC joint.     Additional Tenderness Details  Mild TTP    Cervical/Thoracic Screen   Cervical range of motion within normal limits with the following exceptions: WFL without rad sxs    Active Range of Motion   Left Shoulder   Flexion: 152 degrees   Abduction: 138 (scap plane 150) degrees   External rotation BTH: T2   Internal rotation BTB: T10     Right Shoulder   Normal active range of motion    Passive Range of Motion   Left Shoulder   Flexion: 173 degrees   Abduction: 165 degrees   External rotation 90°: 85 degrees   Internal rotation 90°: 80 degrees     Scapular Mobility   Left Shoulder   Scapular mobility: good    Strength/Myotome Testing     Left Shoulder     Planes of Motion   Flexion: 4+   Abduction: 4+   External rotation at 0°: 5   Internal rotation at 0°: 5     Right Shoulder   Normal muscle strength    Tests     Left Shoulder   Positive Hawkin's.   Negative empty can, full can and Speed's.     Additional Tests Details  Sl + sulcus L vs R; + Tinel ulnar n. At cubital tunnel and last 2 digits cold to touch vs R    Functional Assessment     Comments  Reports mild strain with lift 10 lbs at shoulder level and above; tolerates 20 lbs to wiast but strain at 25 lbs     See Exercise, Manual, and Modality Logs for complete treatment.       Assessment/Plan    Overall improved with increasing  ROM and strength but with recent onset of ulnar nerve sxs.  ROM is functional but with pain with pure abduction and limited tang for working at/above shoulder level.    To MD             Manual Therapy:    0     mins  50002;  Therapeutic Exercise:    39     mins  05469;     Neuromuscular Delonte:    8    mins  96354;    Therapeutic Activity:     5     mins  31271;       Timed Treatment:   52   mins   Total Treatment:     52   mins    Laila Sutton PT  Physical Therapist